# Patient Record
Sex: MALE | Race: WHITE | NOT HISPANIC OR LATINO | Employment: FULL TIME | URBAN - NONMETROPOLITAN AREA
[De-identification: names, ages, dates, MRNs, and addresses within clinical notes are randomized per-mention and may not be internally consistent; named-entity substitution may affect disease eponyms.]

---

## 2024-10-15 NOTE — PROGRESS NOTES
"Subjective   Patient ID: Nav Paiz is a 39 y.o. male who presents for Annual Exam (Pt denies any concerns at this time and reports feeling well, overall).  HPI  Patient of [facility], here for CPE  Due for labs, lipid, CMP - did a health screening at work in 6/2024 - no issues or problems - will send us results  Tdap uptodate in 2018  Having flu shot today  Had colonoscopy in 2021. No stool issues since that time.     Had some right knee pain - patellar tendonitis- did some PT with a family friend and it has improved    Mole on his back that he would like checked out.   Cancer history - mom - breast, grandmother breast cancer, BRCA gene in sister  Both grandfathers had cancer - esophageal, lung and liver cancer    Patient feels he has some issues with not focusing as well as he would like at work. Feels like he may have had this when he was younger, but was able to overcome it at that time.     Objective   Visit Vitals  /75 (BP Location: Right arm, Patient Position: Sitting, BP Cuff Size: Adult)   Pulse 64   Temp 36.6 °C (97.9 °F)   Resp 14   Ht 1.778 m (5' 10\")   Wt 82.5 kg (181 lb 14.4 oz)   SpO2 99%   BMI 26.10 kg/m²   Smoking Status Never   BSA 2.02 m²      Physical Exam  Constitutional:       General: He is not in acute distress.  HENT:      Head: Normocephalic and atraumatic.      Right Ear: Tympanic membrane, ear canal and external ear normal.      Left Ear: Tympanic membrane, ear canal and external ear normal.      Nose: Nose normal.      Mouth/Throat:      Mouth: Mucous membranes are moist.      Pharynx: No posterior oropharyngeal erythema.   Eyes:      General: No scleral icterus.     Extraocular Movements: Extraocular movements intact.      Pupils: Pupils are equal, round, and reactive to light.   Cardiovascular:      Rate and Rhythm: Normal rate and regular rhythm.      Pulses: Normal pulses.      Heart sounds: No murmur heard.  Pulmonary:      Effort: Pulmonary effort is normal. No " respiratory distress.      Breath sounds: Normal breath sounds. No wheezing.   Abdominal:      General: Bowel sounds are normal. There is no distension.      Palpations: Abdomen is soft.      Tenderness: There is no abdominal tenderness.   Musculoskeletal:         General: Normal range of motion.      Cervical back: Neck supple. No rigidity.      Right lower leg: No edema.      Left lower leg: No edema.   Lymphadenopathy:      Cervical: No cervical adenopathy.   Skin:     General: Skin is warm and dry.      Findings: No rash.   Neurological:      General: No focal deficit present.      Mental Status: He is alert and oriented to person, place, and time.   Psychiatric:         Mood and Affect: Mood normal.         Thought Content: Thought content normal.         Assessment/Plan   Problem List Items Addressed This Visit    None  Visit Diagnoses       Routine general medical examination at a health care facility    -  Primary        Interested in evaluation for ADD - will look into psychiatry referral options. - My Chart message sent after visit with information about offices that are willing to evaluate for ADHD in adults.

## 2024-10-21 ENCOUNTER — APPOINTMENT (OUTPATIENT)
Dept: PRIMARY CARE | Facility: CLINIC | Age: 39
End: 2024-10-21
Payer: COMMERCIAL

## 2024-10-21 VITALS
OXYGEN SATURATION: 99 % | WEIGHT: 181.9 LBS | HEART RATE: 64 BPM | TEMPERATURE: 97.9 F | SYSTOLIC BLOOD PRESSURE: 118 MMHG | RESPIRATION RATE: 14 BRPM | BODY MASS INDEX: 26.04 KG/M2 | DIASTOLIC BLOOD PRESSURE: 75 MMHG | HEIGHT: 70 IN

## 2024-10-21 DIAGNOSIS — Z00.00 ROUTINE GENERAL MEDICAL EXAMINATION AT A HEALTH CARE FACILITY: Primary | ICD-10-CM

## 2024-10-21 PROBLEM — R19.8 PAIN WITH BOWEL MOVEMENTS: Status: RESOLVED | Noted: 2024-10-21 | Resolved: 2024-10-21

## 2024-10-21 PROBLEM — R19.5 ABNORMAL STOOL CALIBER: Status: ACTIVE | Noted: 2024-10-21

## 2024-10-21 PROBLEM — M54.9 BACK PAIN: Status: RESOLVED | Noted: 2024-10-21 | Resolved: 2024-10-21

## 2024-10-21 PROBLEM — R19.5 ABNORMAL STOOL CALIBER: Status: RESOLVED | Noted: 2024-10-21 | Resolved: 2024-10-21

## 2024-10-21 PROBLEM — R19.8 PAIN WITH BOWEL MOVEMENTS: Status: ACTIVE | Noted: 2024-10-21

## 2024-10-21 PROBLEM — M54.9 BACK PAIN: Status: ACTIVE | Noted: 2024-10-21

## 2024-10-21 PROBLEM — M94.0 COSTOCHONDRITIS: Status: ACTIVE | Noted: 2024-10-21

## 2024-10-21 PROBLEM — K62.89 ANORECTAL PAIN: Status: RESOLVED | Noted: 2024-10-21 | Resolved: 2024-10-21

## 2024-10-21 PROBLEM — K62.89 ANORECTAL PAIN: Status: ACTIVE | Noted: 2024-10-21

## 2024-10-21 PROBLEM — M94.0 COSTOCHONDRITIS: Status: RESOLVED | Noted: 2024-10-21 | Resolved: 2024-10-21

## 2024-10-21 PROCEDURE — 99395 PREV VISIT EST AGE 18-39: CPT | Performed by: FAMILY MEDICINE

## 2024-10-21 PROCEDURE — 90471 IMMUNIZATION ADMIN: CPT | Performed by: FAMILY MEDICINE

## 2024-10-21 PROCEDURE — 3008F BODY MASS INDEX DOCD: CPT | Performed by: FAMILY MEDICINE

## 2024-10-21 PROCEDURE — 90656 IIV3 VACC NO PRSV 0.5 ML IM: CPT | Performed by: FAMILY MEDICINE

## 2024-10-21 PROCEDURE — 1036F TOBACCO NON-USER: CPT | Performed by: FAMILY MEDICINE

## 2024-10-21 ASSESSMENT — PATIENT HEALTH QUESTIONNAIRE - PHQ9
SUM OF ALL RESPONSES TO PHQ9 QUESTIONS 1 AND 2: 0
2. FEELING DOWN, DEPRESSED OR HOPELESS: NOT AT ALL
1. LITTLE INTEREST OR PLEASURE IN DOING THINGS: NOT AT ALL

## 2024-10-21 NOTE — PATIENT INSTRUCTIONS
Look into whether or not you have had a Hep A vaccine, if not can schedule nurse visit to get this vaccine.     Please send in your labs from the health screening.

## 2025-10-16 ENCOUNTER — APPOINTMENT (OUTPATIENT)
Dept: PRIMARY CARE | Facility: CLINIC | Age: 40
End: 2025-10-16
Payer: COMMERCIAL